# Patient Record
Sex: MALE | Race: WHITE | NOT HISPANIC OR LATINO | Employment: STUDENT | URBAN - METROPOLITAN AREA
[De-identification: names, ages, dates, MRNs, and addresses within clinical notes are randomized per-mention and may not be internally consistent; named-entity substitution may affect disease eponyms.]

---

## 2021-01-03 ENCOUNTER — HOSPITAL ENCOUNTER (INPATIENT)
Facility: MEDICAL CENTER | Age: 21
LOS: 3 days | DRG: 965 | End: 2021-01-06
Attending: EMERGENCY MEDICINE | Admitting: SURGERY
Payer: OTHER GOVERNMENT

## 2021-01-03 ENCOUNTER — APPOINTMENT (OUTPATIENT)
Dept: RADIOLOGY | Facility: MEDICAL CENTER | Age: 21
DRG: 965 | End: 2021-01-03
Attending: EMERGENCY MEDICINE
Payer: OTHER GOVERNMENT
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
## 2021-01-03 DIAGNOSIS — S36.039A LACERATION OF SPLEEN, INITIAL ENCOUNTER: ICD-10-CM

## 2021-01-03 PROBLEM — S37.019A PERINEPHRIC HEMATOMA: Status: ACTIVE | Noted: 2021-01-03

## 2021-01-03 PROBLEM — Z11.9 ENCOUNTER FOR SCREENING EXAMINATION FOR INFECTIOUS DISEASE: Status: ACTIVE | Noted: 2021-01-03

## 2021-01-03 PROBLEM — T14.90XA TRAUMA: Status: ACTIVE | Noted: 2021-01-03

## 2021-01-03 PROBLEM — S27.322A CONTUSION OF BOTH LUNGS: Status: ACTIVE | Noted: 2021-01-03

## 2021-01-03 PROBLEM — Z53.09 CONTRAINDICATION TO DEEP VEIN THROMBOSIS (DVT) PROPHYLAXIS: Status: ACTIVE | Noted: 2021-01-03

## 2021-01-03 LAB
ABO + RH BLD: NORMAL
ABO GROUP BLD: NORMAL
ALBUMIN SERPL BCP-MCNC: 4.1 G/DL (ref 3.2–4.9)
ALBUMIN/GLOB SERPL: 1.6 G/DL
ALP SERPL-CCNC: 80 U/L (ref 30–99)
ALT SERPL-CCNC: 25 U/L (ref 2–50)
ANION GAP SERPL CALC-SCNC: 14 MMOL/L (ref 7–16)
AST SERPL-CCNC: 30 U/L (ref 12–45)
BILIRUB SERPL-MCNC: 0.6 MG/DL (ref 0.1–1.5)
BLD GP AB SCN SERPL QL: NORMAL
BUN SERPL-MCNC: 21 MG/DL (ref 8–22)
CALCIUM SERPL-MCNC: 8.8 MG/DL (ref 8.5–10.5)
CFT BLD TEG: 5.3 MIN (ref 5–10)
CHLORIDE SERPL-SCNC: 102 MMOL/L (ref 96–112)
CLOT ANGLE BLD TEG: 58.2 DEGREES (ref 53–72)
CLOT LYSIS 30M P MA LENFR BLD TEG: 0 % (ref 0–8)
CO2 SERPL-SCNC: 21 MMOL/L (ref 20–33)
COVID ORDER STATUS COVID19: NORMAL
CREAT SERPL-MCNC: 1.01 MG/DL (ref 0.5–1.4)
CT.EXTRINSIC BLD ROTEM: 2.6 MIN (ref 1–3)
ERYTHROCYTE [DISTWIDTH] IN BLOOD BY AUTOMATED COUNT: 39.2 FL (ref 35.9–50)
ETHANOL BLD-MCNC: <10.1 MG/DL (ref 0–10)
FLUAV RNA SPEC QL NAA+PROBE: NEGATIVE
FLUBV RNA SPEC QL NAA+PROBE: NEGATIVE
GLOBULIN SER CALC-MCNC: 2.6 G/DL (ref 1.9–3.5)
GLUCOSE SERPL-MCNC: 157 MG/DL (ref 65–99)
HCT VFR BLD AUTO: 43.2 % (ref 42–52)
HGB BLD-MCNC: 14.4 G/DL (ref 14–18)
HGB BLD-MCNC: 14.6 G/DL (ref 14–18)
LACTATE BLD-SCNC: 2.3 MMOL/L (ref 0.5–2)
MCF BLD TEG: 58.6 MM (ref 50–70)
MCH RBC QN AUTO: 28.3 PG (ref 27–33)
MCHC RBC AUTO-ENTMCNC: 33.8 G/DL (ref 33.7–35.3)
MCV RBC AUTO: 83.9 FL (ref 81.4–97.8)
PA AA BLD-ACNC: 0 %
PA ADP BLD-ACNC: 96.4 %
PLATELET # BLD AUTO: 268 K/UL (ref 164–446)
PMV BLD AUTO: 10.1 FL (ref 9–12.9)
POTASSIUM SERPL-SCNC: 3 MMOL/L (ref 3.6–5.5)
PROT SERPL-MCNC: 6.7 G/DL (ref 6–8.2)
RBC # BLD AUTO: 5.15 M/UL (ref 4.7–6.1)
RH BLD: NORMAL
RSV RNA SPEC QL NAA+PROBE: NEGATIVE
SARS-COV-2 RNA RESP QL NAA+PROBE: NOTDETECTED
SODIUM SERPL-SCNC: 137 MMOL/L (ref 135–145)
SPECIMEN SOURCE: NORMAL
TEG ALGORITHM TGALG: NORMAL
WBC # BLD AUTO: 21.7 K/UL (ref 4.8–10.8)

## 2021-01-03 PROCEDURE — 85384 FIBRINOGEN ACTIVITY: CPT

## 2021-01-03 PROCEDURE — 700102 HCHG RX REV CODE 250 W/ 637 OVERRIDE(OP): Performed by: NURSE PRACTITIONER

## 2021-01-03 PROCEDURE — 83605 ASSAY OF LACTIC ACID: CPT

## 2021-01-03 PROCEDURE — 700111 HCHG RX REV CODE 636 W/ 250 OVERRIDE (IP): Performed by: NURSE PRACTITIONER

## 2021-01-03 PROCEDURE — 86900 BLOOD TYPING SEROLOGIC ABO: CPT

## 2021-01-03 PROCEDURE — 85027 COMPLETE CBC AUTOMATED: CPT

## 2021-01-03 PROCEDURE — 770022 HCHG ROOM/CARE - ICU (200)

## 2021-01-03 PROCEDURE — 71045 X-RAY EXAM CHEST 1 VIEW: CPT

## 2021-01-03 PROCEDURE — 86901 BLOOD TYPING SEROLOGIC RH(D): CPT

## 2021-01-03 PROCEDURE — 85018 HEMOGLOBIN: CPT

## 2021-01-03 PROCEDURE — 71260 CT THORAX DX C+: CPT

## 2021-01-03 PROCEDURE — 82077 ASSAY SPEC XCP UR&BREATH IA: CPT

## 2021-01-03 PROCEDURE — 700105 HCHG RX REV CODE 258: Performed by: NURSE PRACTITIONER

## 2021-01-03 PROCEDURE — 99291 CRITICAL CARE FIRST HOUR: CPT

## 2021-01-03 PROCEDURE — 72128 CT CHEST SPINE W/O DYE: CPT

## 2021-01-03 PROCEDURE — 72131 CT LUMBAR SPINE W/O DYE: CPT

## 2021-01-03 PROCEDURE — 700117 HCHG RX CONTRAST REV CODE 255: Performed by: EMERGENCY MEDICINE

## 2021-01-03 PROCEDURE — A9270 NON-COVERED ITEM OR SERVICE: HCPCS | Performed by: NURSE PRACTITIONER

## 2021-01-03 PROCEDURE — 80053 COMPREHEN METABOLIC PANEL: CPT

## 2021-01-03 PROCEDURE — 70450 CT HEAD/BRAIN W/O DYE: CPT

## 2021-01-03 PROCEDURE — G0390 TRAUMA RESPONS W/HOSP CRITI: HCPCS

## 2021-01-03 PROCEDURE — C9803 HOPD COVID-19 SPEC COLLECT: HCPCS | Performed by: EMERGENCY MEDICINE

## 2021-01-03 PROCEDURE — 0241U HCHG SARS-COV-2 COVID-19 NFCT DS RESP RNA 4 TRGT MIC: CPT

## 2021-01-03 PROCEDURE — 72125 CT NECK SPINE W/O DYE: CPT

## 2021-01-03 PROCEDURE — 94760 N-INVAS EAR/PLS OXIMETRY 1: CPT

## 2021-01-03 PROCEDURE — 85347 COAGULATION TIME ACTIVATED: CPT

## 2021-01-03 PROCEDURE — 85576 BLOOD PLATELET AGGREGATION: CPT

## 2021-01-03 PROCEDURE — 86850 RBC ANTIBODY SCREEN: CPT

## 2021-01-03 RX ORDER — HYDROMORPHONE HYDROCHLORIDE 1 MG/ML
.5-1 INJECTION, SOLUTION INTRAMUSCULAR; INTRAVENOUS; SUBCUTANEOUS
Status: DISCONTINUED | OUTPATIENT
Start: 2021-01-03 | End: 2021-01-04

## 2021-01-03 RX ORDER — ENEMA 19; 7 G/133ML; G/133ML
1 ENEMA RECTAL
Status: DISCONTINUED | OUTPATIENT
Start: 2021-01-03 | End: 2021-01-06 | Stop reason: HOSPADM

## 2021-01-03 RX ORDER — ACETAMINOPHEN 500 MG
1000 TABLET ORAL EVERY 6 HOURS
Status: DISCONTINUED | OUTPATIENT
Start: 2021-01-03 | End: 2021-01-06 | Stop reason: HOSPADM

## 2021-01-03 RX ORDER — OXYCODONE HYDROCHLORIDE 10 MG/1
10 TABLET ORAL
Status: DISCONTINUED | OUTPATIENT
Start: 2021-01-03 | End: 2021-01-05

## 2021-01-03 RX ORDER — FAMOTIDINE 20 MG/1
20 TABLET, FILM COATED ORAL 2 TIMES DAILY
Status: DISCONTINUED | OUTPATIENT
Start: 2021-01-03 | End: 2021-01-04

## 2021-01-03 RX ORDER — ACETAMINOPHEN 325 MG/1
325-650 TABLET ORAL
COMMUNITY

## 2021-01-03 RX ORDER — AMOXICILLIN 250 MG
1 CAPSULE ORAL NIGHTLY
Status: DISCONTINUED | OUTPATIENT
Start: 2021-01-03 | End: 2021-01-06 | Stop reason: HOSPADM

## 2021-01-03 RX ORDER — ACETAMINOPHEN 650 MG/1
650 SUPPOSITORY RECTAL EVERY 4 HOURS PRN
Status: DISCONTINUED | OUTPATIENT
Start: 2021-01-03 | End: 2021-01-04

## 2021-01-03 RX ORDER — AMOXICILLIN 250 MG
1 CAPSULE ORAL
Status: DISCONTINUED | OUTPATIENT
Start: 2021-01-03 | End: 2021-01-06 | Stop reason: HOSPADM

## 2021-01-03 RX ORDER — BISACODYL 10 MG
10 SUPPOSITORY, RECTAL RECTAL
Status: DISCONTINUED | OUTPATIENT
Start: 2021-01-03 | End: 2021-01-06 | Stop reason: HOSPADM

## 2021-01-03 RX ORDER — IBUPROFEN 200 MG
400 TABLET ORAL
Status: ON HOLD | COMMUNITY
End: 2021-01-06

## 2021-01-03 RX ORDER — ACETAMINOPHEN 325 MG/1
650 TABLET ORAL EVERY 4 HOURS PRN
Status: DISCONTINUED | OUTPATIENT
Start: 2021-01-03 | End: 2021-01-05

## 2021-01-03 RX ORDER — OXYCODONE HYDROCHLORIDE 5 MG/1
5 TABLET ORAL
Status: DISCONTINUED | OUTPATIENT
Start: 2021-01-03 | End: 2021-01-05

## 2021-01-03 RX ORDER — POLYETHYLENE GLYCOL 3350 17 G/17G
1 POWDER, FOR SOLUTION ORAL 2 TIMES DAILY
Status: DISCONTINUED | OUTPATIENT
Start: 2021-01-03 | End: 2021-01-06 | Stop reason: HOSPADM

## 2021-01-03 RX ORDER — SODIUM CHLORIDE, SODIUM LACTATE, POTASSIUM CHLORIDE, CALCIUM CHLORIDE 600; 310; 30; 20 MG/100ML; MG/100ML; MG/100ML; MG/100ML
INJECTION, SOLUTION INTRAVENOUS CONTINUOUS
Status: DISCONTINUED | OUTPATIENT
Start: 2021-01-03 | End: 2021-01-04

## 2021-01-03 RX ORDER — DOCUSATE SODIUM 100 MG/1
100 CAPSULE, LIQUID FILLED ORAL 2 TIMES DAILY
Status: DISCONTINUED | OUTPATIENT
Start: 2021-01-03 | End: 2021-01-06 | Stop reason: HOSPADM

## 2021-01-03 RX ADMIN — SODIUM CHLORIDE, POTASSIUM CHLORIDE, SODIUM LACTATE AND CALCIUM CHLORIDE: 600; 310; 30; 20 INJECTION, SOLUTION INTRAVENOUS at 15:30

## 2021-01-03 RX ADMIN — ACETAMINOPHEN 1000 MG: 500 TABLET ORAL at 17:13

## 2021-01-03 RX ADMIN — DOCUSATE SODIUM 100 MG: 100 CAPSULE, LIQUID FILLED ORAL at 17:14

## 2021-01-03 RX ADMIN — OXYCODONE HYDROCHLORIDE 10 MG: 10 TABLET ORAL at 15:46

## 2021-01-03 RX ADMIN — IOHEXOL 100 ML: 350 INJECTION, SOLUTION INTRAVENOUS at 14:16

## 2021-01-03 RX ADMIN — HYDROMORPHONE HYDROCHLORIDE 1 MG: 1 INJECTION, SOLUTION INTRAMUSCULAR; INTRAVENOUS; SUBCUTANEOUS at 16:52

## 2021-01-03 RX ADMIN — FAMOTIDINE 20 MG: 10 INJECTION INTRAVENOUS at 17:13

## 2021-01-03 SDOH — HEALTH STABILITY: MENTAL HEALTH: HOW MANY STANDARD DRINKS CONTAINING ALCOHOL DO YOU HAVE ON A TYPICAL DAY?: 3 OR 4

## 2021-01-03 SDOH — HEALTH STABILITY: MENTAL HEALTH: HOW OFTEN DO YOU HAVE A DRINK CONTAINING ALCOHOL?: 2-4 TIMES A MONTH

## 2021-01-03 SDOH — ECONOMIC STABILITY: TRANSPORTATION INSECURITY
IN THE PAST 12 MONTHS, HAS LACK OF TRANSPORTATION KEPT YOU FROM MEETINGS, WORK, OR FROM GETTING THINGS NEEDED FOR DAILY LIVING?: NO

## 2021-01-03 SDOH — ECONOMIC STABILITY: FOOD INSECURITY: WITHIN THE PAST 12 MONTHS, THE FOOD YOU BOUGHT JUST DIDN'T LAST AND YOU DIDN'T HAVE MONEY TO GET MORE.: NEVER TRUE

## 2021-01-03 SDOH — HEALTH STABILITY: MENTAL HEALTH: HOW OFTEN DO YOU HAVE 6 OR MORE DRINKS ON ONE OCCASION?: NEVER

## 2021-01-03 SDOH — ECONOMIC STABILITY: TRANSPORTATION INSECURITY
IN THE PAST 12 MONTHS, HAS THE LACK OF TRANSPORTATION KEPT YOU FROM MEDICAL APPOINTMENTS OR FROM GETTING MEDICATIONS?: NO

## 2021-01-03 SDOH — ECONOMIC STABILITY: FOOD INSECURITY: WITHIN THE PAST 12 MONTHS, YOU WORRIED THAT YOUR FOOD WOULD RUN OUT BEFORE YOU GOT MONEY TO BUY MORE.: NEVER TRUE

## 2021-01-03 ASSESSMENT — LIFESTYLE VARIABLES
EVER HAD A DRINK FIRST THING IN THE MORNING TO STEADY YOUR NERVES TO GET RID OF A HANGOVER: NO
TOTAL SCORE: 0
TOTAL SCORE: 0
HAVE PEOPLE ANNOYED YOU BY CRITICIZING YOUR DRINKING: NO
ALCOHOL_USE: YES
DOES PATIENT WANT TO STOP DRINKING: NO
TOTAL SCORE: 0
HAVE YOU EVER FELT YOU SHOULD CUT DOWN ON YOUR DRINKING: NO
HOW MANY TIMES IN THE PAST YEAR HAVE YOU HAD 5 OR MORE DRINKS IN A DAY: 0
ON A TYPICAL DAY WHEN YOU DRINK ALCOHOL HOW MANY DRINKS DO YOU HAVE: 4
AVERAGE NUMBER OF DAYS PER WEEK YOU HAVE A DRINK CONTAINING ALCOHOL: 1
DO YOU DRINK ALCOHOL: NO
CONSUMPTION TOTAL: NEGATIVE
EVER FELT BAD OR GUILTY ABOUT YOUR DRINKING: NO

## 2021-01-03 ASSESSMENT — COGNITIVE AND FUNCTIONAL STATUS - GENERAL
SUGGESTED CMS G CODE MODIFIER DAILY ACTIVITY: CI
DAILY ACTIVITIY SCORE: 23
DRESSING REGULAR LOWER BODY CLOTHING: A LITTLE
MOBILITY SCORE: 24
SUGGESTED CMS G CODE MODIFIER MOBILITY: CH

## 2021-01-03 ASSESSMENT — PAIN DESCRIPTION - PAIN TYPE
TYPE: ACUTE PAIN

## 2021-01-03 ASSESSMENT — FIBROSIS 4 INDEX: FIB4 SCORE: 0.43

## 2021-01-03 ASSESSMENT — COPD QUESTIONNAIRES
DURING THE PAST 4 WEEKS HOW MUCH DID YOU FEEL SHORT OF BREATH: NONE/LITTLE OF THE TIME
DO YOU EVER COUGH UP ANY MUCUS OR PHLEGM?: NO/ONLY WITH OCCASIONAL COLDS OR INFECTIONS
COPD SCREENING SCORE: 0
HAVE YOU SMOKED AT LEAST 100 CIGARETTES IN YOUR ENTIRE LIFE: NO/DON'T KNOW

## 2021-01-03 NOTE — PROGRESS NOTES
Pt arrived on SICU rm #[id number]. Pt on cardiac monitor, NIBP, cont pulse ox. Bilateral 18 ga PIVs fl;ush without difficulty, no drainage noted.    2 RN skin check with Heaven[facility]    Abrasions to L cheek and lip    Skin protective measures used    Turn q 2 hr  Extra pillows for padding  Pressure relief mattress

## 2021-01-03 NOTE — H&P
"TRAUMA HISTORY AND PHYSICAL    CHIEF COMPLAINT:     HISTORY OF PRESENT ILLNESS: The patient is a 20 year old male who crashed skiing. He fell onto the left elbow.    He is complaining of LUQ pain.            The patient was triaged as a trauma green     activation in accordance with established pre hospital protocols.  Upon arrival,   primary and secondary surveys with required adjuncts were performed.  CT shows a grade 3 splenic laceration and surrounding hematoma.  Left pulmonary contusion is present.  There is also perinephric fluid on the left likely representing blood.  No gross kidney laceration.  Now admitted to trauma ICU for further care.     PAST MEDICAL HISTORY:       PAST SURGICAL HISTORY: patient denies any surgical history     ALLERGIES: No Known Allergies     CURRENT MEDICATIONS:   Home Medications     Reviewed by Shabana Elder (Pharmacy Tech) on 01/03/21 at 1450  Med List Status: Complete   Medication Last Dose Status   acetaminophen (TYLENOL) 325 MG Tab 1/3/2021 Active   ibuprofen (MOTRIN) 200 MG Tab 1/3/2021 Active                FAMILY HISTORY: History reviewed. No pertinent family history.     SOCIAL HISTORY:   Social History     Tobacco Use   • Smoking status: Never Smoker   • Smokeless tobacco: Never Used   Substance and Sexual Activity   • Alcohol use: Yes     Frequency: 2-4 times a month     Drinks per session: 3 or 4     Binge frequency: Never   • Drug use: Never   • Sexual activity: Not on file       REVIEW OF SYSTEMS: Comprehensive review of systems is negative with the   exception of the aforementioned HPI, PMH, and PSH elements in accordance with CMS guidelines.     PHYSICAL EXAMINATION:     GENERAL: No distress  VITAL SIGNS: /54   Pulse 73   Temp 36.4 °C (97.5 °F) (Tympanic)   Resp (!) 22   Ht 1.905 m (6' 3\")   Wt 91.6 kg (201 lb 15.1 oz)   SpO2 98%   HEAD AND NECK: Pupils:  Equal and Reactive  Dentition: Occlusion is adequate  Facial:  Symmetrical.    NECK: No JVD. " Trachea midline. Cervical tenderness is  absent   CHEST: Breath sounds are equal. No sternal tenderness.  Left costal margin/ lateral rib tenderness.  CARDIOVASCULAR: Regular rhythm  ABDOMEN: Soft, mild luq  tenderness   PELVIS: Stable.  EXTREMITIES: Examination of the upper and lower extremities : No gross long bone or joint deformity.    BACK: No midline stepoffs.  No Tenderness  NEUROLOGIC: Familia Coma Score is 15   .  No gross motor or sensory deficit.     LABORATORY VALUES:   Recent Labs     01/03/21  1347 01/03/21 2017   WBC 21.7*  --    RBC 5.15  --    HEMOGLOBIN 14.6 14.4   HEMATOCRIT 43.2  --    MCV 83.9  --    MCH 28.3  --    MCHC 33.8  --    RDW 39.2  --    PLATELETCT 268  --    MPV 10.1  --      Recent Labs     01/03/21  1347   SODIUM 137   POTASSIUM 3.0*   CHLORIDE 102   CO2 21   GLUCOSE 157*   BUN 21   CREATININE 1.01   CALCIUM 8.8     Recent Labs     01/03/21  1347   ASTSGOT 30   ALTSGPT 25   TBILIRUBIN 0.6   ALKPHOSPHAT 80   GLOBULIN 2.6            IMAGING:   CT-CHEST,ABDOMEN,PELVIS WITH   Final Result      1.  Splenic contusion and lacerations involving the anterior inferior aspect of the spleen are identified consistent with grade 3 splenic injury. Lacerations are greater than 3 cm in length.      2.  Small amount of hemoperitoneum noted around the spleen.      3.  Small amount of fluid is noted in the left perinephric space base. No laceration or contusion is appreciated in the left kidney at this time.      4.  Scattered peripheral groundglass opacifications are identified which could indicate Covid 19 pneumonia. Some of these could also potentially represent pulmonary contusion particularly laterally in the left lower pulmonary lobe near the splenic    injury.      These findings were discussed with BENY HERCULES on 01/03/2021.         CT-TSPINE W/O PLUS RECONS   Final Result      No acute fracture or listhesis in the thoracic spine.      CT-LSPINE W/O PLUS RECONS   Final Result      No acute  fracture or listhesis in the lumbar spine.      CT-HEAD W/O   Final Result      No CT evidence of acute infarct, hemorrhage or mass.      CT-CSPINE WITHOUT PLUS RECONS   Final Result      No acute fracture or listhesis in the cervical spine.      DX-CHEST-LIMITED (1 VIEW)   Final Result         No acute cardiac or pulmonary abnormality is identified.          IMPRESSION AND PLAN:  Spleen laceration  Splenic contusion and lacerations involving the anterior inferior aspect of the spleen are identified consistent with grade 3 splenic injury. Lacerations are greater than 3 cm in length Small amount of hemoperitoneum noted around the spleen.  TEG pending  Serial HH and abdominal exams  NPO for now  Clears ok to start if HCT remains stable    Perinephric hematoma  Small amount of fluid is noted in the left perinephric space base.  No laceration or contusion noted   Serial HH    Contusion of both lungs  Scattered peripheral groundglass opacifications are identified which could indicate Covid 19 pneumonia. Some of these could also potentially represent pulmonary contusion particularly laterally in the left lower pulmonary lobe near the splenic injury.  Supplemental oxygen to keep saturation >93%  Aggressive pulmonary hygiene and oyxgen saturation monitor  Incentive spirometer to bedside, encourage Q1hour use while awake.  Serial Chest Xray's.    Contraindication to deep vein thrombosis (DVT) prophylaxis  Systemic anticoagulation contraindicated secondary to elevated bleeding risk.  1/5 Surveillance venous duplex scanning ordered.    Encounter for screening examination for infectious disease  1/3 COVID-19 specimen sent.    Trauma  Fall while skiing  Trauma Green Activation.  Beka Craft MD. Trauma Surgery.      Decision making of high complexity.  I reviewed clinical labs, trends and orders for follow up.  Review of imaging,reports, consultant documentation .  Utilization of the information in todays decision making.   I  evaluated the patient condition at bedside and discussed the daily plan(s) with available nursing staff,  pharmacists on rounds.   Critical care 40 minutes including bedside, review of imaging and consultation with other physicians.  Intraabdominal hemorrhage, at risk for continued bleeding and shock./   ____________________________________   Beka Craft MD, FACS      DD: 1/3/2021   DT: 2:38 PM

## 2021-01-03 NOTE — ED NOTES
Med rec completed per Pt at bedside.  Pt denies taking any prescription medications.  Allergies reviewed with Pt. No known drug allergies.  No oral antibiotics in last 14 days.

## 2021-01-03 NOTE — ASSESSMENT & PLAN NOTE
Scattered peripheral groundglass opacifications are identified which could indicate Covid 19 pneumonia. Some of these could also potentially represent pulmonary contusion particularly laterally in the left lower pulmonary lobe.  Supplemental oxygen to keep saturation >93%.  Aggressive pulmonary hygiene.

## 2021-01-03 NOTE — ASSESSMENT & PLAN NOTE
Small amount of fluid is noted in the left perinephric space base.  No laceration or contusion noted.  Serial H/H stable and abdominal exams stable.

## 2021-01-03 NOTE — ED TRIAGE NOTES
Chief Complaint   Patient presents with   • Trauma Green     Pt BIB EMS. Pt skier from [employer], fall and with LUQ pain. Denies LOC. Wearing helmet.      Pt/staff masked and in appropriate PPE during encounter.

## 2021-01-03 NOTE — ED PROVIDER NOTES
ED Provider Note    This patient was cared for during the COVID-19 pandemic. History and physical exam may be limited/truncated by the inherent challenges of PPE and the need to decrease staff exposure to novel coronavirus. Some aspects of disease management may be different to protect staff and help slow the spread of disease. I verified that, if possible, the patient was wearing a mask and I was wearing appropriate PPE every time I encountered the patient.       CHIEF COMPLAINT  Chief Complaint   Patient presents with   • Trauma Green     Pt BIB EMS. [facility], fall and with LUQ pain. Denies LOC. Wearing helmet.        HPI  Pushpa Chavis is a 20 y.o. male who presents with left flank pain.  He presents as a trauma green he was skiing at a high rate of speed he lost control and fell onto his left side he braced himself with his arm next to his left chest wall and that is what went into his side this area is what hurts.  He says he has pain when he takes deep breath he is otherwise alert and oriented he did not lose consciousness he was wearing a helmet he has no head or neck pain at this time.      REVIEW OF SYSTEMS  positive for left flank pain, negative for headache neck pain. All other systems are negative.     PAST MEDICAL HISTORY       SOCIAL HISTORY  Social History     Tobacco Use   • Smoking status: Never Smoker   • Smokeless tobacco: Never Used   Substance and Sexual Activity   • Alcohol use: Yes     Frequency: 2-4 times a month     Drinks per session: 3 or 4     Binge frequency: Never   • Drug use: Never   • Sexual activity: Not on file       SURGICAL HISTORY  patient denies any surgical history    CURRENT MEDICATIONS  Home Medications     Reviewed by Shabana Elder (Pharmacy Tech) on 01/03/21 at 1450  Med List Status: Complete   Medication Last Dose Status   acetaminophen (TYLENOL) 325 MG Tab 1/3/2021 Active   ibuprofen (MOTRIN) 200 MG Tab 1/3/2021 Active           "      ALLERGIES  No Known Allergies    PHYSICAL EXAM  VITAL SIGNS: /85   Pulse 90   Temp 36.4 °C (97.5 °F) (Tympanic)   Resp 16   Ht 1.905 m (6' 3\")   Wt 91.6 kg (201 lb 15.1 oz)   SpO2 99%   BMI 25.24 kg/m² .  Constitutional: Alert in no apparent distress.  HENT: Slight erythema around his left eye extraocular movements are all intact, Bilateral external ears normal, Nose normal.   Eyes: Pupils are equal and reactive, Conjunctiva normal, Non-icteric.   Neck: Normal range of motion, No tenderness, Supple, No stridor.   Cardiovascular: Regular rate and rhythm, no murmurs.   Thorax & Lungs: Normal breath sounds, No respiratory distress, No wheezing, No chest tenderness.   Abdomen: Bowel sounds normal, Soft, moderate left upper quadrant tenderness, No masses, No peritoneal signs.  Skin: Warm, Dry, No erythema, No rash.   Musculoskeletal:  no major deformities noted.   Neurologic: Alert,  No focal deficits noted.   Psychiatric: Affect normal, Judgment normal, Mood normal.       DIAGNOSTIC STUDIES / PROCEDURES        LABS  Labs Reviewed   CBC WITHOUT DIFFERENTIAL - Abnormal; Notable for the following components:       Result Value    WBC 21.7 (*)     All other components within normal limits   COMP METABOLIC PANEL - Abnormal; Notable for the following components:    Potassium 3.0 (*)     Glucose 157 (*)     All other components within normal limits   LACTIC ACID - Abnormal; Notable for the following components:    Lactic Acid 2.3 (*)     All other components within normal limits    Narrative:     Droplet, Contact, and Eye Protection   DIAGNOSTIC ALCOHOL   COD (ADULT)   COMPONENT CELLULAR   ESTIMATED GFR   PLATELET MAPPING WITH BASIC TEG    Narrative:     Droplet, Contact, and Eye Protection  Is the patient on heparin (including low molecular weight  heparin, subcutaneous heparin, or lovenox)?->No   COV-2, FLU A/B, AND RSV BY PCR    Narrative:     Is patient being admitted?->Yes  Does this patient meet " criteria for Rush/Cepheid per [facility]  Inpatient Workflow? (See workflow link below)->Yes  Expected turn around time?->Rush (Cepheid 2-4 hours)  Have you been in close contact with a person who is suspected  or known to be positive for COVID-19 within the last 30 days  (e.g. last seen that person < 30 days ago)->No   ABO RH CONFIRM   COVID/SARS COV-2    Narrative:     Is patient being admitted?->Yes  Does this patient meet criteria for Rush/Cepheid per [facility]  Inpatient Workflow? (See workflow link below)->Yes  Expected turn around time?->Rush (Cepheid 2-4 hours)  Have you been in close contact with a person who is suspected  or known to be positive for COVID-19 within the last 30 days  (e.g. last seen that person < 30 days ago)->No   HGB       RADIOLOGY  CT-CHEST,ABDOMEN,PELVIS WITH   Final Result      1.  Splenic contusion and lacerations involving the anterior inferior aspect of the spleen are identified consistent with grade 3 splenic injury. Lacerations are greater than 3 cm in length.      2.  Small amount of hemoperitoneum noted around the spleen.      3.  Small amount of fluid is noted in the left perinephric space base. No laceration or contusion is appreciated in the left kidney at this time.      4.  Scattered peripheral groundglass opacifications are identified which could indicate Covid 19 pneumonia. Some of these could also potentially represent pulmonary contusion particularly laterally in the left lower pulmonary lobe near the splenic    injury.      These findings were discussed with BENY HERCULES on 01/03/2021.         CT-TSPINE W/O PLUS RECONS   Final Result      No acute fracture or listhesis in the thoracic spine.      CT-LSPINE W/O PLUS RECONS   Final Result      No acute fracture or listhesis in the lumbar spine.      CT-HEAD W/O   Final Result      No CT evidence of acute infarct, hemorrhage or mass.      CT-CSPINE WITHOUT PLUS RECONS   Final Result      No acute fracture or listhesis in the  cervical spine.      DX-CHEST-LIMITED (1 VIEW)   Final Result         No acute cardiac or pulmonary abnormality is identified.            Differential Diagnosis includes but is not limited to: Splenic injury rib fracture pneumothorax    COURSE & MEDICAL DECISION MAKING  Pertinent Labs & Imaging studies reviewed. (See chart for details)    This is a 20-year-old that presents after skiing injury.  He does have some left upper quadrant left flank pain concerning for splenic injury.  Trauma scan was initiated.    Skin does demonstrate some splenic contusion and lacerations consistent with a grade 3 splenic injury.  There is a small amount of hemoperitoneum around the spleen.  He also does seem to have some pulmonary contusions as well.    I spoke with Dr. Craft, trauma surgery who will consult and admit the patient to the ICU.  Covid test was sent and ultimately was negative.    Patient will be hospitalized in guarded condition.    FINAL IMPRESSION  1. Laceration of spleen, initial encounter     2. Injury due to skiing accident, initial encounter         2.   3.    This dictation has been creating using voice recognition software. The accuracy of the dictation is limited the abilities of the software.  I expect there may be some errors of grammar and possibly content. I made every attempt to manually correct the errors within my dictation. However errors related to this voice recognition software may still exist and should be interpreted within the appropriate context.      The note accurately reflects work and decisions made by me.  Pilar Rajan M.D.  1/3/2021  6:22 PM

## 2021-01-03 NOTE — ASSESSMENT & PLAN NOTE
Splenic contusion and lacerations involving the anterior inferior aspect of the spleen are identified consistent with grade 3 splenic injury.   Small amount of hemoperitoneum noted around the spleen.  Serial H/H and abdominal exams stable.

## 2021-01-03 NOTE — ASSESSMENT & PLAN NOTE
Initial systemic anticoagulation contraindicated secondary to elevated bleeding risk.  RAP score 2.  1/4 Chemical DVT prophylaxis (Lovenox) initiated.  Ambulate TID.

## 2021-01-04 LAB
BASOPHILS # BLD AUTO: 0.3 % (ref 0–1.8)
BASOPHILS # BLD: 0.03 K/UL (ref 0–0.12)
EOSINOPHIL # BLD AUTO: 0.2 K/UL (ref 0–0.51)
EOSINOPHIL NFR BLD: 1.9 % (ref 0–6.9)
ERYTHROCYTE [DISTWIDTH] IN BLOOD BY AUTOMATED COUNT: 39.3 FL (ref 35.9–50)
HCT VFR BLD AUTO: 41.4 % (ref 42–52)
HGB BLD-MCNC: 13.8 G/DL (ref 14–18)
HGB BLD-MCNC: 14.2 G/DL (ref 14–18)
IMM GRANULOCYTES # BLD AUTO: 0.05 K/UL (ref 0–0.11)
IMM GRANULOCYTES NFR BLD AUTO: 0.5 % (ref 0–0.9)
LYMPHOCYTES # BLD AUTO: 1.57 K/UL (ref 1–4.8)
LYMPHOCYTES NFR BLD: 14.6 % (ref 22–41)
MAGNESIUM SERPL-MCNC: 2 MG/DL (ref 1.5–2.5)
MCH RBC QN AUTO: 28.5 PG (ref 27–33)
MCHC RBC AUTO-ENTMCNC: 34.3 G/DL (ref 33.7–35.3)
MCV RBC AUTO: 83.1 FL (ref 81.4–97.8)
MONOCYTES # BLD AUTO: 1.21 K/UL (ref 0–0.85)
MONOCYTES NFR BLD AUTO: 11.2 % (ref 0–13.4)
NEUTROPHILS # BLD AUTO: 7.7 K/UL (ref 1.82–7.42)
NEUTROPHILS NFR BLD: 71.5 % (ref 44–72)
NRBC # BLD AUTO: 0 K/UL
NRBC BLD-RTO: 0 /100 WBC
PHOSPHATE SERPL-MCNC: 4.5 MG/DL (ref 2.5–4.5)
PLATELET # BLD AUTO: 212 K/UL (ref 164–446)
PMV BLD AUTO: 9.9 FL (ref 9–12.9)
RBC # BLD AUTO: 4.98 M/UL (ref 4.7–6.1)
WBC # BLD AUTO: 10.8 K/UL (ref 4.8–10.8)

## 2021-01-04 PROCEDURE — A9270 NON-COVERED ITEM OR SERVICE: HCPCS | Performed by: NURSE PRACTITIONER

## 2021-01-04 PROCEDURE — 85018 HEMOGLOBIN: CPT

## 2021-01-04 PROCEDURE — 770001 HCHG ROOM/CARE - MED/SURG/GYN PRIV*

## 2021-01-04 PROCEDURE — 700105 HCHG RX REV CODE 258: Performed by: NURSE PRACTITIONER

## 2021-01-04 PROCEDURE — 99232 SBSQ HOSP IP/OBS MODERATE 35: CPT | Performed by: SURGERY

## 2021-01-04 PROCEDURE — 700102 HCHG RX REV CODE 250 W/ 637 OVERRIDE(OP): Performed by: NURSE PRACTITIONER

## 2021-01-04 PROCEDURE — 84100 ASSAY OF PHOSPHORUS: CPT

## 2021-01-04 PROCEDURE — 83735 ASSAY OF MAGNESIUM: CPT

## 2021-01-04 PROCEDURE — 85025 COMPLETE CBC W/AUTO DIFF WBC: CPT

## 2021-01-04 RX ORDER — HYDROMORPHONE HYDROCHLORIDE 1 MG/ML
.5-1 INJECTION, SOLUTION INTRAMUSCULAR; INTRAVENOUS; SUBCUTANEOUS
Status: DISCONTINUED | OUTPATIENT
Start: 2021-01-04 | End: 2021-01-05

## 2021-01-04 RX ADMIN — ACETAMINOPHEN 1000 MG: 500 TABLET ORAL at 11:28

## 2021-01-04 RX ADMIN — ACETAMINOPHEN 1000 MG: 500 TABLET ORAL at 22:55

## 2021-01-04 RX ADMIN — ACETAMINOPHEN 1000 MG: 500 TABLET ORAL at 06:21

## 2021-01-04 RX ADMIN — ACETAMINOPHEN 1000 MG: 500 TABLET ORAL at 00:07

## 2021-01-04 RX ADMIN — FAMOTIDINE 20 MG: 20 TABLET ORAL at 06:21

## 2021-01-04 RX ADMIN — SODIUM CHLORIDE, POTASSIUM CHLORIDE, SODIUM LACTATE AND CALCIUM CHLORIDE: 600; 310; 30; 20 INJECTION, SOLUTION INTRAVENOUS at 01:44

## 2021-01-04 RX ADMIN — ACETAMINOPHEN 1000 MG: 500 TABLET ORAL at 17:07

## 2021-01-04 ASSESSMENT — ENCOUNTER SYMPTOMS
DIZZINESS: 0
ABDOMINAL PAIN: 0
COUGH: 0
ROS GI COMMENTS: LAST BOWEL MOVEMENT PTA
CONSTIPATION: 0
NECK PAIN: 0
BACK PAIN: 1
HEADACHES: 0
MYALGIAS: 1
VOMITING: 0
DIARRHEA: 0
FEVER: 0

## 2021-01-04 ASSESSMENT — PAIN DESCRIPTION - PAIN TYPE
TYPE: ACUTE PAIN

## 2021-01-04 ASSESSMENT — PATIENT HEALTH QUESTIONNAIRE - PHQ9
1. LITTLE INTEREST OR PLEASURE IN DOING THINGS: NOT AT ALL
SUM OF ALL RESPONSES TO PHQ9 QUESTIONS 1 AND 2: 0
2. FEELING DOWN, DEPRESSED, IRRITABLE, OR HOPELESS: NOT AT ALL

## 2021-01-04 ASSESSMENT — FIBROSIS 4 INDEX: FIB4 SCORE: 0.54

## 2021-01-04 NOTE — PROGRESS NOTES
"Trauma Progress Note 1/4/2021 3:13 AM    This is a 20 y.o. male who crashed while skiing. He sustained grade 3 spleen laceration and pulmonary contusion.      Plan:   - continue serial hemograms   - aggressive pulmonary hygiene     Assessment: awake, alert, pain controlled,     /55   Pulse 73   Temp 37 °C (98.6 °F) (Temporal)   Resp 16   Ht 1.905 m (6' 3\")   Wt 91.6 kg (201 lb 15.1 oz)   SpO2 95%   BMI 25.24 kg/m²     Hemoglobin: 13.8 g/dL    Lactic Acid: From 2.3 mmol/L to pending.    Urine Output: voiding / adequate output      Recent Labs     01/03/21  1630   REACTMIN 5.3   CLOTKINET 2.6   CLOTANGL 58.2   MAXCLOTS 58.6   [id number] 0.0   PRCINADP 96.4   PRCINAA 0.0     Contusion of both lungs- (present on admission)  Assessment & Plan  Scattered peripheral groundglass opacifications are identified which could indicate Covid 19 pneumonia. Some of these could also potentially represent pulmonary contusion particularly laterally in the left lower pulmonary lobe near the splenic injury.  Supplemental oxygen to keep saturation >93%  Aggressive pulmonary hygiene and oyxgen saturation monitor  Incentive spirometer to bedside, encourage Q1hour use while awake.  Serial Chest Xray's.    Perinephric hematoma- (present on admission)  Assessment & Plan  Small amount of fluid is noted in the left perinephric space base.  No laceration or contusion noted   Serial HH    Spleen laceration- (present on admission)  Assessment & Plan  Splenic contusion and lacerations involving the anterior inferior aspect of the spleen are identified consistent with grade 3 splenic injury. Lacerations are greater than 3 cm in length Small amount of hemoperitoneum noted around the spleen.  TEG unremarkable  Serial HH and abdominal exams  NPO for now  Clears ok to start if HCT remains stable    Encounter for screening examination for infectious disease- (present on admission)  Assessment & Plan  1/3 COVID-19 specimen sent.  Negative "     Contraindication to deep vein thrombosis (DVT) prophylaxis- (present on admission)  Assessment & Plan  Systemic anticoagulation contraindicated secondary to elevated bleeding risk.  1/5 Surveillance venous duplex scanning ordered.    Trauma- (present on admission)  Assessment & Plan  Fall while skiing  Trauma Green Activation.  Beka Craft MD. Trauma Surgery.

## 2021-01-04 NOTE — PROGRESS NOTES
Trauma / Surgical Daily Progress Note    Date of Service  1/4/2021    Chief Complaint  20 y.o. male admitted 1/3/2021 with Injury due to skiing accident, initial encounter    Interval Events  Admitted overnight  Tertiary survey completed with no further findings  Transfer to [facility]  Diet initiated    - Therapies  - Voiding yellow urine  - Bowel protocol  - Initiate DVT prophylaxis 1/5 if HH stable  - Up ad [place]      Review of Systems  Review of Systems   Constitutional: Positive for malaise/fatigue. Negative for fever.   Respiratory: Negative for cough.    Cardiovascular: Negative for chest pain.   Gastrointestinal: Negative for abdominal pain, constipation, diarrhea and vomiting.        Last bowel movement PTA   Genitourinary: Negative.    Musculoskeletal: Positive for back pain and myalgias. Negative for neck pain.   Neurological: Negative for dizziness and headaches.        Vital Signs  Temp:  [36.4 °C (97.5 °F)-37.1 °C (98.8 °F)] 36.8 °C (98.2 °F)  Pulse:  [] 76  Resp:  [12-26] 22  BP: (101-139)/(54-85) 112/71  SpO2:  [95 %-100 %] 100 %    Physical Exam  Physical Exam  Vitals signs and nursing note reviewed.   Constitutional:       Appearance: Normal appearance. He is normal weight. He is not ill-appearing.   HENT:      Head: Normocephalic.      Nose: Nose normal.      Mouth/Throat:      Mouth: Mucous membranes are moist.   Eyes:      Conjunctiva/sclera: Conjunctivae normal.      Pupils: Pupils are equal, round, and reactive to light.   Neck:      Musculoskeletal: Normal range of motion.   Cardiovascular:      Rate and Rhythm: Normal rate.   Pulmonary:      Effort: Pulmonary effort is normal.      Breath sounds: Normal breath sounds.   Chest:      Chest wall: Tenderness (generalized muscle ) present.   Abdominal:      General: Abdomen is flat. Bowel sounds are normal.      Tenderness: There is abdominal tenderness (generalized ).   Musculoskeletal: Normal range of motion.         General: Tenderness present.    Skin:     General: Skin is warm and dry.      Capillary Refill: Capillary refill takes less than 2 seconds.      Findings: No erythema.      Comments: abrasion   Neurological:      General: No focal deficit present.      Mental Status: He is alert and oriented to person, place, and time.   Psychiatric:         Mood and Affect: Mood normal.         Thought Content: Thought content normal.         Laboratory  Recent Results (from the past 24 hour(s))   DIAGNOSTIC ALCOHOL    Collection Time: 01/03/21  1:47 PM   Result Value Ref Range    Diagnostic Alcohol <10.1 0.0 - 10.0 mg/dL   CBC WITHOUT DIFFERENTIAL    Collection Time: 01/03/21  1:47 PM   Result Value Ref Range    WBC 21.7 (H) 4.8 - 10.8 K/uL    RBC 5.15 4.70 - 6.10 M/uL    Hemoglobin 14.6 14.0 - 18.0 g/dL    Hematocrit 43.2 42.0 - 52.0 %    MCV 83.9 81.4 - 97.8 fL    MCH 28.3 27.0 - 33.0 pg    MCHC 33.8 33.7 - 35.3 g/dL    RDW 39.2 35.9 - 50.0 fL    Platelet Count 268 164 - 446 K/uL    MPV 10.1 9.0 - 12.9 fL   Comp Metabolic Panel    Collection Time: 01/03/21  1:47 PM   Result Value Ref Range    Sodium 137 135 - 145 mmol/L    Potassium 3.0 (L) 3.6 - 5.5 mmol/L    Chloride 102 96 - 112 mmol/L    Co2 21 20 - 33 mmol/L    Anion Gap 14.0 7.0 - 16.0    Glucose 157 (H) 65 - 99 mg/dL    Bun 21 8 - 22 mg/dL    Creatinine 1.01 0.50 - 1.40 mg/dL    Calcium 8.8 8.5 - 10.5 mg/dL    AST(SGOT) 30 12 - 45 U/L    ALT(SGPT) 25 2 - 50 U/L    Alkaline Phosphatase 80 30 - 99 U/L    Total Bilirubin 0.6 0.1 - 1.5 mg/dL    Albumin 4.1 3.2 - 4.9 g/dL    Total Protein 6.7 6.0 - 8.2 g/dL    Globulin 2.6 1.9 - 3.5 g/dL    A-G Ratio 1.6 g/dL   COD - Adult (Type and Screen)    Collection Time: 01/03/21  1:47 PM   Result Value Ref Range    ABO Grouping Only A     Rh Grouping Only NEG     Antibody Screen-Cod NEG    ESTIMATED GFR    Collection Time: 01/03/21  1:47 PM   Result Value Ref Range    GFR If African American >60 >60 mL/min/1.73 m 2    GFR If Non African American >60 >60 mL/min/1.73  m 2   COVID/SARS CoV-2 PCR    Collection Time: 01/03/21  2:55 PM    Specimen: Nasopharyngeal; Respirate   Result Value Ref Range    COVID Order Status Received    CoV-2, Flu A/B, And RSV by PCR    Collection Time: 01/03/21  2:55 PM   Result Value Ref Range    Influenza virus A RNA Negative Negative    Influenza virus B, PCR Negative Negative    RSV, PCR Negative Negative    SARS-CoV-2 by PCR NotDetected     SARS-CoV-2 Source NP Swab    Lactic Acid    Collection Time: 01/03/21  4:30 PM   Result Value Ref Range    Lactic Acid 2.3 (H) 0.5 - 2.0 mmol/L   Platelet Mapping (TEG)    Collection Time: 01/03/21  4:30 PM   Result Value Ref Range    Reaction Time Initial-R 5.3 5.0 - 10.0 min    Clot Kinetics-K 2.6 1.0 - 3.0 min    Clot Angle-Angle 58.2 53.0 - 72.0 degrees    Maximum Clot Strength-MA 58.6 50.0 - 70.0 mm    Lysis 30 minutes-LY30 0.0 0.0 - 8.0 %    % Inhibition ADP 96.4 %    % Inhibition AA 0.0 %    TEG Algorithm Link Algorithm    ABO Rh Confirm    Collection Time: 01/03/21  8:17 PM   Result Value Ref Range    ABO Rh Confirm A NEG    Hemoglobin - Q6 hours x4    Collection Time: 01/03/21  8:17 PM   Result Value Ref Range    Hemoglobin 14.4 14.0 - 18.0 g/dL   Hemoglobin - Q6 hours x4    Collection Time: 01/04/21  2:24 AM   Result Value Ref Range    Hemoglobin 13.8 (L) 14.0 - 18.0 g/dL   Magnesium: Every Monday and Thursday AM    Collection Time: 01/04/21  2:24 AM   Result Value Ref Range    Magnesium 2.0 1.5 - 2.5 mg/dL   Phosphorus: Every Monday and Thursday AM    Collection Time: 01/04/21  2:24 AM   Result Value Ref Range    Phosphorus 4.5 2.5 - 4.5 mg/dL   CBC with Differential: Tomorrow AM    Collection Time: 01/04/21  7:28 AM   Result Value Ref Range    WBC 10.8 4.8 - 10.8 K/uL    RBC 4.98 4.70 - 6.10 M/uL    Hemoglobin 14.2 14.0 - 18.0 g/dL    Hematocrit 41.4 (L) 42.0 - 52.0 %    MCV 83.1 81.4 - 97.8 fL    MCH 28.5 27.0 - 33.0 pg    MCHC 34.3 33.7 - 35.3 g/dL    RDW 39.3 35.9 - 50.0 fL    Platelet Count 212  164 - 446 K/uL    MPV 9.9 9.0 - 12.9 fL    Neutrophils-Polys 71.50 44.00 - 72.00 %    Lymphocytes 14.60 (L) 22.00 - 41.00 %    Monocytes 11.20 0.00 - 13.40 %    Eosinophils 1.90 0.00 - 6.90 %    Basophils 0.30 0.00 - 1.80 %    Immature Granulocytes 0.50 0.00 - 0.90 %    Nucleated RBC 0.00 /100 WBC    Neutrophils (Absolute) 7.70 (H) 1.82 - 7.42 K/uL    Lymphs (Absolute) 1.57 1.00 - 4.80 K/uL    Monos (Absolute) 1.21 (H) 0.00 - 0.85 K/uL    Eos (Absolute) 0.20 0.00 - 0.51 K/uL    Baso (Absolute) 0.03 0.00 - 0.12 K/uL    Immature Granulocytes (abs) 0.05 0.00 - 0.11 K/uL    NRBC (Absolute) 0.00 K/uL       Fluids    Intake/Output Summary (Last 24 hours) at 1/4/2021 1052  Last data filed at 1/4/2021 1000  Gross per 24 hour   Intake 856 ml   Output 1300 ml   Net -444 ml       Core Measures & Quality Metrics  Labs reviewed, Medications reviewed and Radiology images reviewed  Simeon catheter: No Simeon                  RAP Score Total: 2    ETOH Screening    Assessment/Plan  Contusion of both lungs- (present on admission)  Assessment & Plan  Scattered peripheral groundglass opacifications are identified which could indicate Covid 19 pneumonia. Some of these could also potentially represent pulmonary contusion particularly laterally in the left lower pulmonary lobe near the splenic injury.  Supplemental oxygen to keep saturation >93%  Aggressive pulmonary hygiene and oyxgen saturation monitor  Incentive spirometer to bedside, encourage Q1hour use while awake.  Serial Chest Xray's.    Perinephric hematoma- (present on admission)  Assessment & Plan  Small amount of fluid is noted in the left perinephric space base.  No laceration or contusion noted   Serial HH    Spleen laceration- (present on admission)  Assessment & Plan  Splenic contusion and lacerations involving the anterior inferior aspect of the spleen are identified consistent with grade 3 splenic injury. Lacerations are greater than 3 cm in length Small amount of  hemoperitoneum noted around the spleen.  TEG unremarkable  Serial HH and abdominal exams  1/4 Regular diet initiated     Encounter for screening examination for infectious disease- (present on admission)  Assessment & Plan  1/3 COVID-19 specimen sent.  Negative     Contraindication to deep vein thrombosis (DVT) prophylaxis- (present on admission)  Assessment & Plan  Systemic anticoagulation contraindicated secondary to elevated bleeding risk.  1/5 Surveillance venous duplex scanning ordered.    Trauma- (present on admission)  Assessment & Plan  Fall while skiing  Trauma Green Activation.  Beka Craft MD. Trauma Surgery.        Discussed patient condition with RN, Patient and trauma surgery Dr. Holguin.

## 2021-01-04 NOTE — DISCHARGE PLANNING
Care Transition Team Assessment    In the case of an emergency, pt's legal NOK is mother, Kourtney Hawthorne [phone]     LSW met with pt at bedside and obtained the following information used in this assessment. Pt verified accuracy of facesheet.  Pt is a single 20 year old male with no children. No ACP documents and booklet declined. Pt lives with his family in a two level house in [place], [place].  Prior to current hospitalization, pt was completely independent in ADLS/IADLS. Pt drives. No financial barriers for discharge at this time. Pt denies any hx of substance abuse and denies any hx of mh. Pharmacy is [facility].     Barriers to dc: Medical clearance     LSW will continue to assist with social/dc needs     Care Transition Team Assessment    Information Source  Orientation : Oriented x 4  Information Given By: Patient  Who is responsible for making decisions for patient? : Patient    Readmission Evaluation  Is this a readmission?: No    Elopement Risk  Legal Hold: No  Ambulatory or Self Mobile in Wheelchair: No-Not an Elopement Risk  Disoriented: No  Psychiatric Symptoms: None  History of Wandering: No  Elopement this Admit: No  Vocalizing Wanting to Leave: No  Displays Behaviors, Body Language Wanting to Leave: No-Not at Risk for Elopement    Interdisciplinary Discharge Planning  Lives with - Patient's Self Care Capacity: Parents  Patient or legal guardian wants to designate a caregiver: Yes  Caregiver name: Armando Hawthrone  Caregiver contact info: [phone]  Support Systems: Friends / Neighbors, Parent, Family Member(s)  Housing / Facility: 2 Story House    Discharge Preparedness  What is your plan after discharge?: Uncertain - pending medical team collaboration, Home with help  What are your discharge supports?: Parent  Prior Functional Level: Ambulatory, Drives Self, Independent with Activities of Daily Living, Independent with Medication Management    Functional Assesment  Prior Functional Level: Ambulatory,  Drives Self, Independent with Activities of Daily Living, Independent with Medication Management    Vision / Hearing Impairment  Vision Impairment : No  Hearing Impairment : No    Advance Directive  Advance Directive?: None  Advance Directive offered?: AD Booklet refused    Domestic Abuse  Have you ever been the victim of abuse or violence?: No  Physical Abuse or Sexual Abuse: No  Verbal Abuse or Emotional Abuse: No  Possible Abuse/Neglect Reported to:: Not Applicable    Psychological Assessment  History of Substance Abuse: None  History of Psychiatric Problems: No  Non-compliant with Treatment: No  Newly Diagnosed Illness: Yes    Discharge Risks or Barriers  Discharge risks or barriers?: Transportation, Complex medical needs  Patient risk factors: Complex medical needs    Anticipated Discharge Information  Discharge Disposition: Still a Patient (30)

## 2021-01-04 NOTE — PROGRESS NOTES
"TRAUMA TERTIARY SURVEY     Mental status adequate for full examination?: Yes    Spine cleared (radiologically and/or clinically): Yes    PHYSICAL EXAMINATION:  Vitals: /71   Pulse 76   Temp 36.8 °C (98.2 °F) (Temporal)   Resp 18   Ht 1.905 m (6' 3\")   Wt 91.6 kg (201 lb 15.1 oz)   SpO2 100%   BMI 25.24 kg/m²   Constitutional:     General Appearance: appears stated age, is in no apparent distress.  HEENT:     No significant external craniofacial trauma. The pupils are equal, round, and reactive to light bilaterally. The extraocular muscles are intact bilaterally.. The nares and oropharynx are clear. The midface and jaw are stable. No malocclusion is evident.  Neck:    No posterior midline cervical-spine tenderness, no evidence of intoxication, normal level of alertness (Familia Coma Scale 15), no focal neurologic deficit, and no painful distracting injuries.  Respiratory:   Inspection: Unlabored respirations, no intercostal retractions, paradoxical motion, or accessory muscle use.   Palpation:  The chest is nontender. The clavicles are non deformed bilaterally..   Auscultation: normal, clear to auscultation.  Cardiovascular:   Auscultation: normal and regular rate and rhythm.   Peripheral Pulses: Normal.   Abdomen:   Abdomen is soft, tender, without organomegaly or masses.  Genitourinary:   (MALE): normal male external genitalia.  Musculoskeletal:   The pelvis is stable.  No significant angulation, deformity, or soft tissue injury involving the upper and lower extremities. Normal range of motion.   Back:   The thoracolumbar spine was examined. Examination is remarkable for no significant tenderness, swelling, or deformity in the thoracolumbar region.  Skin:   The skin is warm and dry.  Neurologic:    Familia Coma Scale (GCS) 15 [id number]. Neurologic examination revealed oriented for age x3.  Psychiatric:   The patient does not appear depressed or anxious.    IMAGING:  CT-CHEST,ABDOMEN,PELVIS WITH   Final " Result      1.  Splenic contusion and lacerations involving the anterior inferior aspect of the spleen are identified consistent with grade 3 splenic injury. Lacerations are greater than 3 cm in length.      2.  Small amount of hemoperitoneum noted around the spleen.      3.  Small amount of fluid is noted in the left perinephric space base. No laceration or contusion is appreciated in the left kidney at this time.      4.  Scattered peripheral groundglass opacifications are identified which could indicate Covid 19 pneumonia. Some of these could also potentially represent pulmonary contusion particularly laterally in the left lower pulmonary lobe near the splenic    injury.      These findings were discussed with BENY HERCULES on 01/03/2021.         CT-TSPINE W/O PLUS RECONS   Final Result      No acute fracture or listhesis in the thoracic spine.      CT-LSPINE W/O PLUS RECONS   Final Result      No acute fracture or listhesis in the lumbar spine.      CT-HEAD W/O   Final Result      No CT evidence of acute infarct, hemorrhage or mass.      CT-CSPINE WITHOUT PLUS RECONS   Final Result      No acute fracture or listhesis in the cervical spine.      DX-CHEST-LIMITED (1 VIEW)   Final Result         No acute cardiac or pulmonary abnormality is identified.        All current laboratory studies/radiology exams reviewed: Yes    Completed Consultations:  NA  Pending Consultations:  NA    Newly Identified Diagnoses and Injuries:  Na- Recheck extremities once ambulating      TOTAL RAP SCORE:  RAP Score Total: 2      ETOH Screening

## 2021-01-05 PROBLEM — Z78.9 NO CONTRAINDICATION TO DEEP VEIN THROMBOSIS (DVT) PROPHYLAXIS: Status: ACTIVE | Noted: 2021-01-03

## 2021-01-05 LAB
ALBUMIN SERPL BCP-MCNC: 3.8 G/DL (ref 3.2–4.9)
ALBUMIN/GLOB SERPL: 1.4 G/DL
ALP SERPL-CCNC: 73 U/L (ref 30–99)
ALT SERPL-CCNC: 15 U/L (ref 2–50)
ANION GAP SERPL CALC-SCNC: 8 MMOL/L (ref 7–16)
AST SERPL-CCNC: 24 U/L (ref 12–45)
BASOPHILS # BLD AUTO: 0.4 % (ref 0–1.8)
BASOPHILS # BLD: 0.04 K/UL (ref 0–0.12)
BILIRUB SERPL-MCNC: 0.5 MG/DL (ref 0.1–1.5)
BUN SERPL-MCNC: 16 MG/DL (ref 8–22)
CALCIUM SERPL-MCNC: 8.9 MG/DL (ref 8.5–10.5)
CHLORIDE SERPL-SCNC: 105 MMOL/L (ref 96–112)
CO2 SERPL-SCNC: 24 MMOL/L (ref 20–33)
CREAT SERPL-MCNC: 1.01 MG/DL (ref 0.5–1.4)
EOSINOPHIL # BLD AUTO: 0.33 K/UL (ref 0–0.51)
EOSINOPHIL NFR BLD: 3.3 % (ref 0–6.9)
ERYTHROCYTE [DISTWIDTH] IN BLOOD BY AUTOMATED COUNT: 40.3 FL (ref 35.9–50)
GLOBULIN SER CALC-MCNC: 2.8 G/DL (ref 1.9–3.5)
GLUCOSE SERPL-MCNC: 106 MG/DL (ref 65–99)
HCT VFR BLD AUTO: 42 % (ref 42–52)
HGB BLD-MCNC: 14.1 G/DL (ref 14–18)
IMM GRANULOCYTES # BLD AUTO: 0.05 K/UL (ref 0–0.11)
IMM GRANULOCYTES NFR BLD AUTO: 0.5 % (ref 0–0.9)
LYMPHOCYTES # BLD AUTO: 1.78 K/UL (ref 1–4.8)
LYMPHOCYTES NFR BLD: 18 % (ref 22–41)
MCH RBC QN AUTO: 28.4 PG (ref 27–33)
MCHC RBC AUTO-ENTMCNC: 33.6 G/DL (ref 33.7–35.3)
MCV RBC AUTO: 84.7 FL (ref 81.4–97.8)
MONOCYTES # BLD AUTO: 1.05 K/UL (ref 0–0.85)
MONOCYTES NFR BLD AUTO: 10.6 % (ref 0–13.4)
NEUTROPHILS # BLD AUTO: 6.64 K/UL (ref 1.82–7.42)
NEUTROPHILS NFR BLD: 67.2 % (ref 44–72)
NRBC # BLD AUTO: 0 K/UL
NRBC BLD-RTO: 0 /100 WBC
PLATELET # BLD AUTO: 201 K/UL (ref 164–446)
PMV BLD AUTO: 10.2 FL (ref 9–12.9)
POTASSIUM SERPL-SCNC: 4.1 MMOL/L (ref 3.6–5.5)
PROT SERPL-MCNC: 6.6 G/DL (ref 6–8.2)
RBC # BLD AUTO: 4.96 M/UL (ref 4.7–6.1)
SODIUM SERPL-SCNC: 137 MMOL/L (ref 135–145)
WBC # BLD AUTO: 9.9 K/UL (ref 4.8–10.8)

## 2021-01-05 PROCEDURE — A9270 NON-COVERED ITEM OR SERVICE: HCPCS | Performed by: NURSE PRACTITIONER

## 2021-01-05 PROCEDURE — 85025 COMPLETE CBC W/AUTO DIFF WBC: CPT

## 2021-01-05 PROCEDURE — 770006 HCHG ROOM/CARE - MED/SURG/GYN SEMI*

## 2021-01-05 PROCEDURE — 99232 SBSQ HOSP IP/OBS MODERATE 35: CPT | Performed by: SURGERY

## 2021-01-05 PROCEDURE — 700102 HCHG RX REV CODE 250 W/ 637 OVERRIDE(OP): Performed by: NURSE PRACTITIONER

## 2021-01-05 PROCEDURE — 80053 COMPREHEN METABOLIC PANEL: CPT

## 2021-01-05 PROCEDURE — 700111 HCHG RX REV CODE 636 W/ 250 OVERRIDE (IP): Performed by: SURGERY

## 2021-01-05 RX ORDER — BACITRACIN ZINC AND POLYMYXIN B SULFATE 500; 1000 [USP'U]/G; [USP'U]/G
OINTMENT TOPICAL 2 TIMES DAILY
Status: DISCONTINUED | OUTPATIENT
Start: 2021-01-05 | End: 2021-01-06 | Stop reason: HOSPADM

## 2021-01-05 RX ORDER — OXYCODONE HYDROCHLORIDE 5 MG/1
5 TABLET ORAL EVERY 4 HOURS PRN
Status: DISCONTINUED | OUTPATIENT
Start: 2021-01-05 | End: 2021-01-06 | Stop reason: HOSPADM

## 2021-01-05 RX ADMIN — ENOXAPARIN SODIUM 30 MG: 30 INJECTION SUBCUTANEOUS at 05:16

## 2021-01-05 RX ADMIN — ACETAMINOPHEN 1000 MG: 500 TABLET ORAL at 18:00

## 2021-01-05 ASSESSMENT — ENCOUNTER SYMPTOMS
FEVER: 0
ABDOMINAL PAIN: 0
BACK PAIN: 0
NAUSEA: 0
DOUBLE VISION: 0
SENSORY CHANGE: 0
NECK PAIN: 0
COUGH: 0
FOCAL WEAKNESS: 0
VOMITING: 0
SPEECH CHANGE: 0
BLURRED VISION: 0
CHILLS: 0
DIZZINESS: 0
CONSTIPATION: 0
HEADACHES: 0
TINGLING: 0
MYALGIAS: 0

## 2021-01-05 NOTE — PROGRESS NOTES
Trauma / Surgical Daily Progress Note    Date of Service  1/5/2021    Chief Complaint  20 y.o. male admitted 1/3/2021 with a grade III splenic laceration and pulmonary contusions    Interval Events  Remains in SICU awaiting [facility] transfer  Adequate pain control with minimal narcotic use, wants to go home, plans to stay in town a few days before returning to [place]  Serial H/H and abdominal exams stable    - Normalize daily activities  - Continue observation  - Transfer to [facility]    Review of Systems  Review of Systems   Constitutional: Negative for chills, fever and malaise/fatigue.   HENT: Negative for hearing loss.    Eyes: Negative for blurred vision and double vision.   Respiratory: Negative for cough.    Cardiovascular: Negative for chest pain.   Gastrointestinal: Negative for abdominal pain (mild), constipation (BM 1/4), nausea and vomiting.   Genitourinary: Negative for dysuria (voiding).   Musculoskeletal: Negative for back pain, joint pain, myalgias and neck pain.   Skin: Negative for rash.   Neurological: Negative for dizziness, tingling, sensory change, speech change, focal weakness and headaches.        Vital Signs  Temp:  [36.8 °C (98.2 °F)-36.9 °C (98.5 °F)] 36.9 °C (98.5 °F)  Pulse:  [65-95] 65  Resp:  [14-18] 14  BP: (112-123)/(59-78) 118/65  SpO2:  [98 %-100 %] 100 %    Physical Exam  Physical Exam  Vitals signs and nursing note reviewed.   Constitutional:       General: He is awake. He is not in acute distress.     Appearance: He is well-developed. He is not ill-appearing.   HENT:      Head: Normocephalic.      Comments: Facial abrasions     Nose: Nose normal.      Mouth/Throat:      Mouth: Mucous membranes are moist.      Pharynx: Oropharynx is clear.   Eyes:      Extraocular Movements: Extraocular movements intact.      Pupils: Pupils are equal, round, and reactive to light.   Neck:      Musculoskeletal: Neck supple.   Pulmonary:      Effort: Pulmonary effort is normal. No respiratory distress.    Musculoskeletal:      Comments: Moves all extremities   Skin:     General: Skin is warm and dry.      Findings: No erythema.      Comments: abrasion   Neurological:      Mental Status: He is alert.      GCS: GCS eye subscore is 4. GCS verbal subscore is 5. GCS motor subscore is 6.   Psychiatric:         Mood and Affect: Mood normal.         Behavior: Behavior normal. Behavior is cooperative.         Laboratory  Recent Results (from the past 24 hour(s))   CBC WITH DIFFERENTIAL    Collection Time: 01/05/21  5:15 AM   Result Value Ref Range    WBC 9.9 4.8 - 10.8 K/uL    RBC 4.96 4.70 - 6.10 M/uL    Hemoglobin 14.1 14.0 - 18.0 g/dL    Hematocrit 42.0 42.0 - 52.0 %    MCV 84.7 81.4 - 97.8 fL    MCH 28.4 27.0 - 33.0 pg    MCHC 33.6 (L) 33.7 - 35.3 g/dL    RDW 40.3 35.9 - 50.0 fL    Platelet Count 201 164 - 446 K/uL    MPV 10.2 9.0 - 12.9 fL    Neutrophils-Polys 67.20 44.00 - 72.00 %    Lymphocytes 18.00 (L) 22.00 - 41.00 %    Monocytes 10.60 0.00 - 13.40 %    Eosinophils 3.30 0.00 - 6.90 %    Basophils 0.40 0.00 - 1.80 %    Immature Granulocytes 0.50 0.00 - 0.90 %    Nucleated RBC 0.00 /100 WBC    Neutrophils (Absolute) 6.64 1.82 - 7.42 K/uL    Lymphs (Absolute) 1.78 1.00 - 4.80 K/uL    Monos (Absolute) 1.05 (H) 0.00 - 0.85 K/uL    Eos (Absolute) 0.33 0.00 - 0.51 K/uL    Baso (Absolute) 0.04 0.00 - 0.12 K/uL    Immature Granulocytes (abs) 0.05 0.00 - 0.11 K/uL    NRBC (Absolute) 0.00 K/uL   Comp Metabolic Panel (CMP): Tomorrow AM    Collection Time: 01/05/21  5:15 AM   Result Value Ref Range    Sodium 137 135 - 145 mmol/L    Potassium 4.1 3.6 - 5.5 mmol/L    Chloride 105 96 - 112 mmol/L    Co2 24 20 - 33 mmol/L    Anion Gap 8.0 7.0 - 16.0    Glucose 106 (H) 65 - 99 mg/dL    Bun 16 8 - 22 mg/dL    Creatinine 1.01 0.50 - 1.40 mg/dL    Calcium 8.9 8.5 - 10.5 mg/dL    AST(SGOT) 24 12 - 45 U/L    ALT(SGPT) 15 2 - 50 U/L    Alkaline Phosphatase 73 30 - 99 U/L    Total Bilirubin 0.5 0.1 - 1.5 mg/dL    Albumin 3.8 3.2 - 4.9  g/dL    Total Protein 6.6 6.0 - 8.2 g/dL    Globulin 2.8 1.9 - 3.5 g/dL    A-G Ratio 1.4 g/dL   ESTIMATED GFR    Collection Time: 01/05/21  5:15 AM   Result Value Ref Range    GFR If African American >60 >60 mL/min/1.73 m 2    GFR If Non African American >60 >60 mL/min/1.73 m 2       Fluids    Intake/Output Summary (Last 24 hours) at 1/5/2021 0923  Last data filed at 1/4/2021 2000  Gross per 24 hour   Intake 939 ml   Output 1800 ml   Net -861 ml       Core Measures & Quality Metrics  Labs reviewed, Medications reviewed and Radiology images reviewed  Simeon catheter: No Simeon      DVT Prophylaxis: Enoxaparin (Lovenox)  DVT prophylaxis - mechanical: SCDs  Ulcer prophylaxis: Not indicated    Assessed for rehab: Patient returned to prior level of function, rehabilitation not indicated at this time    RAP Score Total: 2    ETOH Screening  CAGE Score: 0  Assessment complete date: 1/5/2021 (Admission BA negative, CAGE negative)        Assessment/Plan  Spleen laceration- (present on admission)  Assessment & Plan  Splenic contusion and lacerations involving the anterior inferior aspect of the spleen are identified consistent with grade 3 splenic injury.   Small amount of hemoperitoneum noted around the spleen.  1/5 Hb stable    Contusion of both lungs- (present on admission)  Assessment & Plan  Scattered peripheral groundglass opacifications are identified which could indicate Covid 19 pneumonia. Some of these could also potentially represent pulmonary contusion particularly laterally in the left lower pulmonary lobe.  Supplemental oxygen to keep saturation >93%.  Aggressive pulmonary hygiene and serial chest radiography.    Perinephric hematoma- (present on admission)  Assessment & Plan  Small amount of fluid is noted in the left perinephric space base.  No laceration or contusion noted.  1/5 Serial H/H stable.    Screening examination for infectious disease- (present on admission)  Assessment & Plan  Admission SARS-CoV-2  testing negative. LOW RISK patient. Repeat SARS-CoV-2 testing not indicated. Isolation precautions de-escalated.    No contraindication to deep vein thrombosis (DVT) prophylaxis- (present on admission)  Assessment & Plan  Initial systemic anticoagulation contraindicated secondary to elevated bleeding risk.  RAP score 2.  1/4 Chemical DVT prophylaxis (Lovenox) initiated.  Ambulate TID.    Trauma- (present on admission)  Assessment & Plan  Fall while skiing.  Trauma Green Activation.  Beka Craft MD. Trauma Surgery.      Discussed patient condition with RN, , , Patient and trauma surgery. Dr. NAEEM De La O

## 2021-01-06 ENCOUNTER — PHARMACY VISIT (OUTPATIENT)
Dept: PHARMACY | Facility: MEDICAL CENTER | Age: 21
End: 2021-01-06
Payer: COMMERCIAL

## 2021-01-06 VITALS
DIASTOLIC BLOOD PRESSURE: 70 MMHG | BODY MASS INDEX: 25.11 KG/M2 | SYSTOLIC BLOOD PRESSURE: 115 MMHG | WEIGHT: 201.94 LBS | HEIGHT: 75 IN | HEART RATE: 81 BPM | OXYGEN SATURATION: 99 % | RESPIRATION RATE: 16 BRPM | TEMPERATURE: 97.6 F

## 2021-01-06 LAB
BASOPHILS # BLD AUTO: 0.2 % (ref 0–1.8)
BASOPHILS # BLD: 0.02 K/UL (ref 0–0.12)
EOSINOPHIL # BLD AUTO: 0.36 K/UL (ref 0–0.51)
EOSINOPHIL NFR BLD: 3.5 % (ref 0–6.9)
ERYTHROCYTE [DISTWIDTH] IN BLOOD BY AUTOMATED COUNT: 38.8 FL (ref 35.9–50)
HCT VFR BLD AUTO: 44 % (ref 42–52)
HGB BLD-MCNC: 14.7 G/DL (ref 14–18)
IMM GRANULOCYTES # BLD AUTO: 0.04 K/UL (ref 0–0.11)
IMM GRANULOCYTES NFR BLD AUTO: 0.4 % (ref 0–0.9)
LYMPHOCYTES # BLD AUTO: 2.77 K/UL (ref 1–4.8)
LYMPHOCYTES NFR BLD: 27.2 % (ref 22–41)
MCH RBC QN AUTO: 27.8 PG (ref 27–33)
MCHC RBC AUTO-ENTMCNC: 33.4 G/DL (ref 33.7–35.3)
MCV RBC AUTO: 83.2 FL (ref 81.4–97.8)
MONOCYTES # BLD AUTO: 1.07 K/UL (ref 0–0.85)
MONOCYTES NFR BLD AUTO: 10.5 % (ref 0–13.4)
NEUTROPHILS # BLD AUTO: 5.93 K/UL (ref 1.82–7.42)
NEUTROPHILS NFR BLD: 58.2 % (ref 44–72)
NRBC # BLD AUTO: 0 K/UL
NRBC BLD-RTO: 0 /100 WBC
PLATELET # BLD AUTO: 213 K/UL (ref 164–446)
PMV BLD AUTO: 10.1 FL (ref 9–12.9)
RBC # BLD AUTO: 5.29 M/UL (ref 4.7–6.1)
WBC # BLD AUTO: 10.2 K/UL (ref 4.8–10.8)

## 2021-01-06 PROCEDURE — A9270 NON-COVERED ITEM OR SERVICE: HCPCS | Performed by: NURSE PRACTITIONER

## 2021-01-06 PROCEDURE — 700102 HCHG RX REV CODE 250 W/ 637 OVERRIDE(OP): Performed by: NURSE PRACTITIONER

## 2021-01-06 PROCEDURE — 85025 COMPLETE CBC W/AUTO DIFF WBC: CPT

## 2021-01-06 PROCEDURE — 700101 HCHG RX REV CODE 250: Performed by: NURSE PRACTITIONER

## 2021-01-06 PROCEDURE — RXMED WILLOW AMBULATORY MEDICATION CHARGE: Performed by: NURSE PRACTITIONER

## 2021-01-06 RX ORDER — BACITRACIN ZINC AND POLYMYXIN B SULFATE 500; 1000 [USP'U]/G; [USP'U]/G
OINTMENT TOPICAL 2 TIMES DAILY
Refills: 0 | COMMUNITY
Start: 2021-01-06

## 2021-01-06 RX ORDER — OXYCODONE HYDROCHLORIDE 5 MG/1
5 TABLET ORAL EVERY 6 HOURS PRN
Qty: 6 TAB | Refills: 0 | Status: SHIPPED | OUTPATIENT
Start: 2021-01-06 | End: 2021-01-09

## 2021-01-06 RX ADMIN — ACETAMINOPHEN 1000 MG: 500 TABLET ORAL at 12:10

## 2021-01-06 RX ADMIN — Medication 1 EACH: at 05:39

## 2021-01-06 RX ADMIN — ACETAMINOPHEN 1000 MG: 500 TABLET ORAL at 05:37

## 2021-01-06 ASSESSMENT — PAIN DESCRIPTION - PAIN TYPE
TYPE: ACUTE PAIN

## 2021-01-06 ASSESSMENT — ENCOUNTER SYMPTOMS
NECK PAIN: 0
BLURRED VISION: 0
CONSTIPATION: 0
VOMITING: 0
COUGH: 0
NAUSEA: 0
DIZZINESS: 0
TINGLING: 0
FEVER: 0
SPEECH CHANGE: 0
ABDOMINAL PAIN: 0
BACK PAIN: 0
HEADACHES: 0
FOCAL WEAKNESS: 0
SENSORY CHANGE: 0
DOUBLE VISION: 0
MYALGIAS: 0
CHILLS: 0

## 2021-01-06 NOTE — PROGRESS NOTES
Trauma / Surgical Daily Progress Note    Date of Service  1/6/2021    Chief Complaint  20 y.o. male admitted 1/3/2021 with Injury due to skiing accident, initial encounter    Interval Events  Transfer from SICU to [facility]  Doing well, pain controlled with tylenol, tolerating room air and oral diet, voiding without issue, ambulating halls frequently, wants to go home  Serial abdominal exams and H/H stable    - Disposition home    Review of Systems  Review of Systems   Constitutional: Negative for chills, fever and malaise/fatigue.   HENT: Negative for hearing loss.    Eyes: Negative for blurred vision and double vision.   Respiratory: Negative for cough.    Cardiovascular: Negative for chest pain.   Gastrointestinal: Negative for abdominal pain (mild), constipation (BM 1/4), nausea and vomiting.   Genitourinary: Negative for dysuria (voiding).   Musculoskeletal: Negative for back pain, joint pain, myalgias and neck pain.   Skin: Negative for rash.   Neurological: Negative for dizziness, tingling, sensory change, speech change, focal weakness and headaches.        Vital Signs  Temp:  [36.4 °C (97.5 °F)-36.6 °C (97.9 °F)] 36.4 °C (97.6 °F)  Pulse:  [61-84] 81  Resp:  [16] 16  BP: (109-126)/(67-83) 115/70  SpO2:  [98 %-100 %] 99 %    Physical Exam  Physical Exam  Vitals signs and nursing note reviewed.   Constitutional:       General: He is awake. He is not in acute distress.     Appearance: He is well-developed. He is not ill-appearing.   HENT:      Head: Normocephalic.      Comments: Facial abrasions     Nose: Nose normal.      Mouth/Throat:      Mouth: Mucous membranes are moist.      Pharynx: Oropharynx is clear.   Eyes:      Extraocular Movements: Extraocular movements intact.      Pupils: Pupils are equal, round, and reactive to light.   Neck:      Musculoskeletal: Neck supple.   Pulmonary:      Effort: Pulmonary effort is normal. No respiratory distress.   Musculoskeletal:      Comments: Ambulatory   Skin:      General: Skin is warm and dry.   Neurological:      Mental Status: He is alert.      GCS: GCS eye subscore is 4. GCS verbal subscore is 5. GCS motor subscore is 6.   Psychiatric:         Mood and Affect: Mood normal.         Behavior: Behavior normal. Behavior is cooperative.         Laboratory  Recent Results (from the past 24 hour(s))   CBC WITH DIFFERENTIAL    Collection Time: 01/06/21  2:24 AM   Result Value Ref Range    WBC 10.2 4.8 - 10.8 K/uL    RBC 5.29 4.70 - 6.10 M/uL    Hemoglobin 14.7 14.0 - 18.0 g/dL    Hematocrit 44.0 42.0 - 52.0 %    MCV 83.2 81.4 - 97.8 fL    MCH 27.8 27.0 - 33.0 pg    MCHC 33.4 (L) 33.7 - 35.3 g/dL    RDW 38.8 35.9 - 50.0 fL    Platelet Count 213 164 - 446 K/uL    MPV 10.1 9.0 - 12.9 fL    Neutrophils-Polys 58.20 44.00 - 72.00 %    Lymphocytes 27.20 22.00 - 41.00 %    Monocytes 10.50 0.00 - 13.40 %    Eosinophils 3.50 0.00 - 6.90 %    Basophils 0.20 0.00 - 1.80 %    Immature Granulocytes 0.40 0.00 - 0.90 %    Nucleated RBC 0.00 /100 WBC    Neutrophils (Absolute) 5.93 1.82 - 7.42 K/uL    Lymphs (Absolute) 2.77 1.00 - 4.80 K/uL    Monos (Absolute) 1.07 (H) 0.00 - 0.85 K/uL    Eos (Absolute) 0.36 0.00 - 0.51 K/uL    Baso (Absolute) 0.02 0.00 - 0.12 K/uL    Immature Granulocytes (abs) 0.04 0.00 - 0.11 K/uL    NRBC (Absolute) 0.00 K/uL       Fluids    Intake/Output Summary (Last 24 hours) at 1/6/2021 1126  Last data filed at 1/6/2021 0725  Gross per 24 hour   Intake 840 ml   Output 0 ml   Net 840 ml       Core Measures & Quality Metrics  Labs reviewed, Medications reviewed and Radiology images reviewed  Simeon catheter: No Simeon      DVT Prophylaxis: Enoxaparin (Lovenox)  DVT prophylaxis - mechanical: SCDs  Ulcer prophylaxis: Not indicated    Assessed for rehab: Patient returned to prior level of function, rehabilitation not indicated at this time    RAP Score Total: 2    ETOH Screening  CAGE Score: 0  Assessment complete date: 1/5/2021 (Admission BA negative, CAGE  negative)        Assessment/Plan  Spleen laceration- (present on admission)  Assessment & Plan  Splenic contusion and lacerations involving the anterior inferior aspect of the spleen are identified consistent with grade 3 splenic injury.   Small amount of hemoperitoneum noted around the spleen.  Serial H/H and abdominal exams stable.    Contusion of both lungs- (present on admission)  Assessment & Plan  Scattered peripheral groundglass opacifications are identified which could indicate Covid 19 pneumonia. Some of these could also potentially represent pulmonary contusion particularly laterally in the left lower pulmonary lobe.  Supplemental oxygen to keep saturation >93%.  Aggressive pulmonary hygiene.    Perinephric hematoma- (present on admission)  Assessment & Plan  Small amount of fluid is noted in the left perinephric space base.  No laceration or contusion noted.  Serial H/H stable and abdominal exams stable.    Screening examination for infectious disease- (present on admission)  Assessment & Plan  Admission SARS-CoV-2 testing negative. LOW RISK patient. Repeat SARS-CoV-2 testing not indicated. Isolation precautions de-escalated.    No contraindication to deep vein thrombosis (DVT) prophylaxis- (present on admission)  Assessment & Plan  Initial systemic anticoagulation contraindicated secondary to elevated bleeding risk.  RAP score 2.  1/4 Chemical DVT prophylaxis (Lovenox) initiated.  Ambulate TID.    Trauma- (present on admission)  Assessment & Plan  Fall while skiing.  Trauma Green [employer].  Beka Craft MD. Trauma Surgery.      Discussed patient condition with RN, , , Patient and trauma surgery. Dr. Craft

## 2021-01-06 NOTE — PROGRESS NOTES
Pt resting in bed, no s/s of distress, rr even and unlabored, pt denies pain, declines to take scheduled tylenol at this time. Call light In reach, bedside table in reach, will continue to monitor.

## 2021-01-06 NOTE — PROGRESS NOTES
"Report received. Assessment complete. Pt A&O x4    Vital Signs: /70   Pulse 81   Temp 36.4 °C (97.6 °F) (Temporal)   Resp 16   Ht 1.905 m (6' 3\")   Wt 91.6 kg (201 lb 15.1 oz)   SpO2 99%   BMI 25.24 kg/m²     Pain: Patient has 1/10 pain. Pain managed with prescribed medications as needed.    Neuro: Denies numbness/tingling    Cardiac: Denies chest pain. Regular heart sounds    Vascular pulses +2 BUE, BLE. No edema noted    Respiratory: Patient denies SOB. On RA. Sating 90%<. Reinforced education on IS.     GI: Patient passing flatus, last BM. Tolerating regular diet. Denies N/V.    : Patient voiding adequately.    MSK: Patient ambulates with no assist. Bed alarm NA. Patient calling appropriately    Skin: in tact. Small abrasion left arm and left hip    Discharge Barriers/Plan: Possible DC today?    Fall precautions in place. Treaded socks on. Bed locked in lowest position. Call light and personal belongings at bedside within reach. Reinforced use of call light to patient. Hourly rounding in place.  All needs met at this time and POC discussed.     "

## 2021-01-06 NOTE — PROGRESS NOTES
Patient to be discharged today - patient aware and agreeable to plan. D/c instructions reviewed with patient - ?'s/concerns answered. meds to beds delivered to patient

## 2021-01-06 NOTE — DISCHARGE INSTRUCTIONS
- Call or seek medical attention for questions or concerns  - Follow up with Dr. Craft or local primary care provider in 1 weeks time  - Resume regular diet  - May take over the counter acetaminophen as needed for pain  - Continue daily over the counter stool softener while on narcotics  - No operation of machinery or motorized vehicles while under the influence of narcotics  - No alcohol, marijuana or illicit drug use while under the influence of narcotics  - In the event of a narcotic overdose naloxone (Narcan) is available without a prescription from any [facility] or [facility]  - No contact sports, strenuous activities, or heavy lifting until cleared by outpatient provider  - If respiratory decompensation, persistent or worsening pain, or signs or symptoms of infection occur seek medical attention        Splenic Injury    A splenic injury is an injury of the spleen. The spleen is an organ located in the upper left area of the abdomen, just under the ribs. The spleen filters and cleans the blood. It also stores blood cells and destroys cells that are worn out. The spleen also plays an important role in fighting disease.  Splenic injuries can vary. In some cases, the spleen may only be bruised, with some bleeding inside the covering and around the spleen. Splenic injuries may also cause a deep tear or cut into the spleen (lacerated spleen). Some splenic injuries can cause the spleen to break open (rupture).  What are the causes?  This condition may be caused by a direct blow (trauma) to the spleen. Trauma can result from:  · Car accidents.  · Contact sports.  · Falls.  · Penetrating injuries. These can be caused by gunshot wounds or sharp objects such as a knife.  What increases the risk?  You may be at greater risk for a splenic injury if you have a disease that can cause the spleen to become enlarged. These include:  · Alcoholic liver disease.  · Viral infections, especially mononucleosis.  · Certain  inflammatory diseases, such as lupus.  · Certain cancers, especially those that involve the lymphatic system.  · Cystic fibrosis.  What are the signs or symptoms?  Symptoms of this condition depend on the severity of the injury. A minor injury often causes no symptoms or only minor pain in the abdomen. A major injury can result in severe bleeding, causing your blood pressure to decrease rapidly. This in turn will cause symptoms such as:  · Dizziness or light-headedness.  · Rapid heart rate.  · Difficulty breathing.  · Fainting.  · Sweating with clammy skin.  Other symptoms of a splenic injury may include:  · Very bad abdominal pain.  · Pain in the left shoulder.  · Pain when the abdomen is pressed (tenderness).  · Nausea.  · Swelling or bruising of the abdomen.  How is this diagnosed?  This condition may be diagnosed based on:  · Your symptoms and medical history, especially if you were recently in an accident or you recently got hurt.  · A physical exam.  · Imaging tests, such as:  ? CT scan.  ? Ultrasound.  You may have frequent blood tests for a few days after the injury to monitor your condition.  How is this treated?  Treatment depends on the type of splenic injury you have and how bad it is.  · Less severe injuries may be treated with:  ? Observation.  ? Interventional radiology. This involves using flexible tubes (catheters) to stop the bleeding from inside the blood vessel.  · More severe injuries may require hospitalization in the intensive care unit (ICU). While you are in the hospital:  ? Your fluid and blood levels will be monitored closely.  ? You will get fluids through an IV as needed.  ? You may need follow-up scans to check whether your spleen is able to heal itself. If the injury is getting worse, you may need surgery.  ? You may receive donated blood (transfusion).  ? You may have a long needle inserted into your abdomen to remove any blood that has collected inside the spleen (hematoma).  · If  your blood pressure is too low, you may need emergency surgery. This may include:  ? Repairing a laceration.  ? Removing part of the spleen.  ? Removing the entire spleen (splenectomy).  Follow these instructions at home:  Activity  · Rest as told by your health care provider.  · Avoid sitting for a long time without moving. Get up to take short walks every 1-2 hours. This is important to improve blood flow and breathing. Ask for help if you feel weak or unsteady.  · Do not participate in any activity that takes a lot of effort until your health care provider says that it is safe.  · Do not take part in contact sports until your health care provider says it is safe to do so.  · Do not lift anything that is heavier than 10 lb (4.5 kg), or the limit that you are told, until your health care provider says that it is safe.  General instructions  · Take over-the-counter and prescription medicines only as told by your health care provider.  · Stay up-to-date on vaccines as told by your health care provider.  · Follow instructions from your health care provider about eating or drinking restrictions.  · Do not drink alcohol.  · Do not use any products that contain nicotine or tobacco, such as cigarettes and e-cigarettes. These may delay healing after an injury. If you need help quitting, ask your health care provider.  · Keep all follow-up visits as told by your health care provider. This is important.  Get help right away if you have:  · A fever.  · New or increasing pain in your abdomen or in your left shoulder.  · Signs or symptoms of internal bleeding. Watch for:  ? Sweating.  ? Dizziness.  ? Weakness.  ? Cold and clammy skin.  ? Fainting.  · Chest pain or difficulty breathing.  Summary  · The spleen is an organ located in the upper left area of the abdomen, just under the ribs.  · A splenic injury is an injury of the spleen. This may be caused by a direct blow (trauma) to the spleen.  · You may be at greater risk for a  splenic injury if you have a disease that can cause the spleen to become enlarged.  · A minor injury often causes no symptoms or only minor pain in the abdomen. A major injury can result in severe bleeding, causing your blood pressure to decrease rapidly.  · Treatment depends on the type of splenic injury you have and how bad it is.  This information is not intended to replace advice given to you by your health care provider. Make sure you discuss any questions you have with your health care provider.  Document Released: 10/09/2007 Document Revised: 03/01/2019 Document Reviewed: 12/12/2018  [employer] Patient Education © 2020 [employer]      Discharge Instructions    Discharged to home by car with relative. Discharged via walking, hospital escort: Yes.  Special equipment needed: Not Applicable    Be sure to schedule a follow-up appointment with your primary care doctor or any specialists as instructed.     Discharge Plan:   Influenza Vaccine Indication: Not indicated: Previously immunized this influenza season and > 8 years of age    I understand that a diet low in cholesterol, fat, and sodium is recommended for good health. Unless I have been given specific instructions below for another diet, I accept this instruction as my diet prescription.   Other diet: regular    Special Instructions: None    · Is patient discharged on Warfarin / Coumadin?   No     Depression / Suicide Risk    As you are discharged from this [facility] facility, it is important to learn how to keep safe from harming yourself.    Recognize the warning signs:  · Abrupt changes in personality, positive or negative- including increase in energy   · Giving away possessions  · Change in eating patterns- significant weight changes-  positive or negative  · Change in sleeping patterns- unable to sleep or sleeping all the time   · Unwillingness or inability to communicate  · Depression  · Unusual sadness, discouragement and loneliness  · Talk of  wanting to die  · Neglect of personal appearance   · Rebelliousness- reckless behavior  · Withdrawal from people/activities they love  · Confusion- inability to concentrate     If you or a loved one observes any of these behaviors or has concerns about self-harm, here's what you can do:  · Talk about it- your feelings and reasons for harming yourself  · Remove any means that you might use to hurt yourself (examples: pills, rope, extension cords, firearm)  · Get professional help from the community (Mental Health, Substance Abuse, psychological counseling)  · Do not be alone:Call your Safe Contact- someone whom you trust who will be there for you.  · Call your local CRISIS HOTLINE 133-8525 or 216-103-9767  · Call your local Children's Mobile Crisis Response Team [place] (901) 532-6304 or www.[employer]  · Call the toll free National Suicide Prevention Hotlines   · National Suicide Prevention Lifeline 406-888-DPKK (3872)  · National Hope Line Network 800-SUICIDE (225-5082)        Incision Care, Adult  An incision is a cut that a doctor makes in your skin for surgery (for a procedure). Most times, these cuts are closed after surgery. Your cut from surgery may be closed with stitches (sutures), staples, skin glue, or skin tape (adhesive strips). You may need to return to your doctor to have stitches or staples taken out. This may happen many days or many weeks after your surgery. The cut needs to be well cared for so it does not get infected.  How to care for your cut  Cut care    · Follow instructions from your doctor about how to take care of your cut. Make sure you:  ? Wash your hands with soap and water before you change your bandage (dressing). If you cannot use soap and water, use hand .  ? Change your bandage as told by your doctor.  ? Leave stitches, skin glue, or skin tape in place. They may need to stay in place for 2 weeks or longer. If tape strips get loose and curl up, you may trim the  loose edges. Do not remove tape strips completely unless your doctor says it is okay.  · Check your cut area every day for signs of infection. Check for:  ? More redness, swelling, or pain.  ? More fluid or blood.  ? Warmth.  ? Pus or a bad smell.  · Ask your doctor how to clean the cut. This may include:  ? Using mild soap and water.  ? Using a clean towel to pat the cut dry after you clean it.  ? Putting a cream or ointment on the cut. Do this only as told by your doctor.  ? Covering the cut with a clean bandage.  · Ask your doctor when you can leave the cut uncovered.  · Do not take baths, swim, or use a hot tub until your doctor says it is okay. Ask your doctor if you can take showers. You may only be allowed to take sponge baths for bathing.  Medicines  · If you were prescribed an antibiotic medicine, cream, or ointment, take the antibiotic or put it on the cut as told by your doctor. Do not stop taking or putting on the antibiotic even if your condition gets better.  · Take over-the-counter and prescription medicines only as told by your doctor.  General instructions  · Limit movement around your cut. This helps healing.  ? Avoid straining, lifting, or exercise for the first month, or for as long as told by your doctor.  ? Follow instructions from your doctor about going back to your normal activities.  ? Ask your doctor what activities are safe.  · Protect your cut from the sun when you are outside for the first 6 months, or for as long as told by your doctor. Put on sunscreen around the scar or cover up the scar.  · Keep all follow-up visits as told by your doctor. This is important.  Contact a doctor if:  · Your have more redness, swelling, or pain around the cut.  · You have more fluid or blood coming from the cut.  · Your cut feels warm to the touch.  · You have pus or a bad smell coming from the cut.  · You have a fever or shaking chills.  · You feel sick to your stomach (nauseous) or you throw up  (vomit).  · You are dizzy.  · Your stitches or staples come undone.  Get help right away if:  · You have a red streak coming from your cut.  · Your cut bleeds through the bandage and the bleeding does not stop with gentle pressure.  · The edges of your cut open up and separate.  · You have very bad (severe) pain.  · You have a rash.  · You are confused.  · You pass out (faint).  · You have trouble breathing and you have a fast heartbeat.  This information is not intended to replace advice given to you by your health care provider. Make sure you discuss any questions you have with your health care provider.  Document Released: 03/11/2013 Document Revised: 05/07/2018 Document Reviewed: 08/25/2017  Elsevier Patient Education © 2020 [employer]

## 2021-01-06 NOTE — PROGRESS NOTES
Patient transferred to [facility]. Pharmacy to deliver medications down there while they wait for ride

## 2021-01-06 NOTE — DISCHARGE SUMMARY
Trauma Discharge Summary    DATE OF ADMISSION: 1/3/2021    DATE OF DISCHARGE: 1/6/2021    LENGTH OF STAY: 3 days    ATTENDING PHYSICIAN: Beka Craft M.D.    CONSULTING PHYSICIAN: None    DISCHARGE DIAGNOSIS:  1.  Splenic laceration  2.  Contusion of both lungs  3.  Perinephric hematoma  4.  No contraindication to deep vein thrombosis prophylaxis  5.  Screening examination for infectious disease  6.  Trauma    PROCEDURES: None    HISTORY OF PRESENT ILLNESS: The patient is a 20 y.o. male who was injured in a fall while skiing.  He was subsequently transferred to [facility] for definite trauma care.  He was triaged as a Trauma in accordance with established pre-hospital protocols.    HOSPITAL COURSE: On arrival, he underwent extensive radiographic and laboratory studies and was admitted to the critical care team under the direction and supervision of Dr. Beka Craft.  He sustained the listed injuries and incurred the listed diagnosis during his stay.  Admission SARS-CoV-2 was negative.    He was transferred from the emergency department to the trauma intensive care unit where a tertiary exam was performed.  He did have a grade 3 splenic laceration as well as a small left perinephric hematoma.  These were managed nonoperatively and he was monitored with serial laboratory studies and abdominal exams.  He also had bilateral pulmonary contusions and was provided aggressive pulmonary hygiene.  The patient remained stable and bedrest was discontinued.  Activity was increased and he was medically stable for [facility].    On the day of discharge he is tolerating room air and a regular diet.  He is ambulating the halls frequently reporting adequate pain control with very minimal narcotic use.  He does have some small abrasions to his face which are healing as expected.    DISCHARGE PHYSICAL EXAM: See epic physical exam dated 1/6/2021    DISCHARGE MEDICATIONS:  I reviewed the  patients controlled substance history and obtained a controlled substance use informed consent (if applicable) provided by [facility] and the patient has been prescribed.       Medication List      START taking these medications      Instructions   bacitracin-polymyxin b [id number] UNIT/GM Oint  Commonly known as: POLYSPORIN   Apply  topically 2 Times a Day.     oxyCODONE immediate-release 5 MG Tabs  Commonly known as: ROXICODONE   Take 1 Tab by mouth every 6 hours as needed for Severe Pain for up to 3 days.  Dose: 5 mg        CONTINUE taking these medications      Instructions   acetaminophen 325 MG Tabs  Commonly known as: Tylenol   Take 325-650 mg by mouth 1 time a day as needed for Mild Pain or Moderate Pain. 1 to 2 tablets = 325 to 650 mg.  Dose: 325-650 mg        STOP taking these medications    ibuprofen 200 MG Tabs  Commonly known as: MOTRIN            DISPOSITION: The patient will be discharged home in stable condition on 1/6/2021.  He will follow up with Dr. Craft as needed.  He will follow-up with a local primary care provider as soon as possible.    The patient has been extensively counseled and all questions have been answered. Special attention was paid to respiratory decompensation, persistent or worsening pain and signs and symptoms of infection and to seek immediate medical attention if these develop. The patient demonstrates understanding and gives verbal compliance with discharge instructions.    TIME SPENT ON DISCHARGE: 35 minutes      ____________________________________________  JOSEFA Mack.     See Progress note.  Stable for discharge as above.  Medications and plan reviewed.      DD: 1/6/2021 1:04 PM

## 2021-01-06 NOTE — DISCHARGE PLANNING
Meds-to-Beds: Discharge prescription order listed below delivered to patient in discharge lounge. RN notified. Patient counseled. Patient elected to have co-payment billed to patient account.       Charly Hawthorne   Home Medication Instructions EARL:[id number]    Printed on:01/06/21 0513   Medication Information                      oxyCODONE immediate-release (ROXICODONE) 5 MG Tab  Take 1 Tab by mouth every 6 hours as needed for Severe Pain for up to 3 days.                 Donita Carlson, PharmD